# Patient Record
Sex: FEMALE | URBAN - NONMETROPOLITAN AREA
[De-identification: names, ages, dates, MRNs, and addresses within clinical notes are randomized per-mention and may not be internally consistent; named-entity substitution may affect disease eponyms.]

---

## 2018-07-31 ENCOUNTER — OFFICE VISIT (OUTPATIENT)
Dept: VASCULAR SURGERY | Age: 73
End: 2018-07-31
Payer: MEDICARE

## 2018-07-31 ENCOUNTER — OFFICE VISIT (OUTPATIENT)
Dept: CARDIOLOGY | Age: 73
End: 2018-07-31
Payer: MEDICARE

## 2018-07-31 ENCOUNTER — TELEPHONE (OUTPATIENT)
Dept: CARDIOLOGY | Age: 73
End: 2018-07-31

## 2018-07-31 VITALS — SYSTOLIC BLOOD PRESSURE: 120 MMHG | TEMPERATURE: 97 F | DIASTOLIC BLOOD PRESSURE: 75 MMHG | HEART RATE: 76 BPM

## 2018-07-31 VITALS
SYSTOLIC BLOOD PRESSURE: 126 MMHG | WEIGHT: 130 LBS | BODY MASS INDEX: 23.04 KG/M2 | HEIGHT: 63 IN | DIASTOLIC BLOOD PRESSURE: 62 MMHG | HEART RATE: 80 BPM

## 2018-07-31 DIAGNOSIS — G62.9 NEUROPATHY: Primary | ICD-10-CM

## 2018-07-31 DIAGNOSIS — I10 HYPERTENSION, UNSPECIFIED TYPE: ICD-10-CM

## 2018-07-31 PROCEDURE — 99203 OFFICE O/P NEW LOW 30 MIN: CPT | Performed by: PHYSICIAN ASSISTANT

## 2018-07-31 PROCEDURE — 93000 ELECTROCARDIOGRAM COMPLETE: CPT | Performed by: CLINICAL NURSE SPECIALIST

## 2018-07-31 PROCEDURE — 1036F TOBACCO NON-USER: CPT | Performed by: PHYSICIAN ASSISTANT

## 2018-07-31 PROCEDURE — G8420 CALC BMI NORM PARAMETERS: HCPCS | Performed by: PHYSICIAN ASSISTANT

## 2018-07-31 PROCEDURE — 1123F ACP DISCUSS/DSCN MKR DOCD: CPT | Performed by: PHYSICIAN ASSISTANT

## 2018-07-31 PROCEDURE — 3017F COLORECTAL CA SCREEN DOC REV: CPT | Performed by: PHYSICIAN ASSISTANT

## 2018-07-31 PROCEDURE — G8400 PT W/DXA NO RESULTS DOC: HCPCS | Performed by: PHYSICIAN ASSISTANT

## 2018-07-31 PROCEDURE — 1101F PT FALLS ASSESS-DOCD LE1/YR: CPT | Performed by: PHYSICIAN ASSISTANT

## 2018-07-31 PROCEDURE — 1090F PRES/ABSN URINE INCON ASSESS: CPT | Performed by: PHYSICIAN ASSISTANT

## 2018-07-31 PROCEDURE — G8427 DOCREV CUR MEDS BY ELIG CLIN: HCPCS | Performed by: PHYSICIAN ASSISTANT

## 2018-07-31 PROCEDURE — 4040F PNEUMOC VAC/ADMIN/RCVD: CPT | Performed by: PHYSICIAN ASSISTANT

## 2018-07-31 NOTE — PROGRESS NOTES
[facility], [address] [facility] [address] [place] [address], [address] [address]  Phone: [phone]  Fax: [phone]    OFFICE VISIT:  2018    Gatito Ca - : 1945    Reason For Visit:  Jany Dwyer is a 75 y.o. female who is here for New Patient (Patient has had no cardiac symptoms)    HPI   Patient was referred by her PCP for a mildly abnormal NONA the patient hasn't done at a health screening. Alvino Headley is PCP. Gatito Ca has the following history as recorded in [facility]:    Patient Active Problem List    Diagnosis Date Noted    Hypertension 2018     History reviewed. No pertinent past medical history. Past Surgical History:   Procedure Laterality Date    EYE SURGERY      KNEE SURGERY      TONSILLECTOMY      TUBAL LIGATION       Family History   Problem Relation Age of Onset    Hypertension Mother      Social History   Substance Use Topics    Smoking status: Never Smoker    Smokeless tobacco: Never Used    Alcohol use No      No current outpatient prescriptions on file. No current facility-administered medications for this visit. Allergies: Patient has no known allergies. Review of Systems  ROS    Objective  Vital Signs - /62   Pulse 80   Ht 5' 3\" (1.6 m)   Wt 130 lb (59 kg)   BMI 23.03 kg/m²   Physical Exam    Assessment:     Diagnosis Orders   1. Hypertension, unspecified type  EKG 12 lead     EKG shows normal sinus rhythm rate 80  Stable cardiovascular status. No evidence of overt heart failure, angina or dysrhythmia. Plan    Orders Placed This Encounter   Procedures    EKG 12 lead     Order Specific Question:   Reason for Exam?     Answer: Other     No charge for visit visit today. Patient should have been referred to vascular surgery for an abnormal NONA. We arranged a same-day appointment today at vascular surgery office.         DEUCE Rooney

## 2018-07-31 NOTE — PROGRESS NOTES
Patient Care Team:  Ulises Scales as PCP - General (Nurse Practitioner Family)  LEEANNA Francis MD as Consulting Physician (Cardiology)      History and Physical    Ms. Willie Shields is a 76 yo female who presents today as a referral for evaluation of abnormal NONA's from a [facility] screening. She reports that she is currently not on any medication. Rose Green has not had new wounds. She  reports no claudication. She reports some numbness in hr feet. Old records have been obtained, reviewed, and summarized. Samanta Smith is a 75 y.o. female with the following history reviewed and recorded in :  Patient Active Problem List    Diagnosis Date Noted    Hypertension 07/31/2018     No current outpatient prescriptions on file. No current facility-administered medications for this visit. Allergies: Patient has no known allergies. History reviewed. No pertinent past medical history. Past Surgical History:   Procedure Laterality Date    EYE SURGERY      KNEE SURGERY      TONSILLECTOMY      TUBAL LIGATION       Family History   Problem Relation Age of Onset    Hypertension Mother      Social History   Substance Use Topics    Smoking status: Never Smoker    Smokeless tobacco: Never Used    Alcohol use No       Review of Systems    Constitutional - no significant activity change, appetite change, or unexpected weight change. No fever or chills. No diaphoresis or significant fatigue. HENT - no significant rhinorrhea or epistaxis. No tinnitus or significant hearing loss. Eyes - no sudden vision change or amaurosis. Respiratory - no significant shortness of breath, wheezing, or stridor. No apnea, cough, or chest tightness associated with shortness of breath. Cardiovascular - no chest pain, syncope, or significant dizziness. No palpitations or significant leg swelling. Patient reports no claudication. Gastrointestinal - no abdominal swelling or pain. No blood in stool.   No severe constipation,

## 2024-08-19 ENCOUNTER — TELEPHONE (OUTPATIENT)
Dept: VASCULAR SURGERY | Facility: CLINIC | Age: 79
End: 2024-08-19
Payer: MEDICARE

## 2024-08-20 ENCOUNTER — TELEPHONE (OUTPATIENT)
Dept: VASCULAR SURGERY | Facility: CLINIC | Age: 79
End: 2024-08-20
Payer: MEDICARE

## 2024-08-21 ENCOUNTER — OFFICE VISIT (OUTPATIENT)
Dept: VASCULAR SURGERY | Facility: CLINIC | Age: 79
End: 2024-08-21
Payer: MEDICARE

## 2024-08-21 VITALS
SYSTOLIC BLOOD PRESSURE: 130 MMHG | HEIGHT: 63 IN | BODY MASS INDEX: 19.49 KG/M2 | DIASTOLIC BLOOD PRESSURE: 76 MMHG | OXYGEN SATURATION: 97 % | HEART RATE: 88 BPM | WEIGHT: 110 LBS

## 2024-08-21 DIAGNOSIS — I83.93 ASYMPTOMATIC VARICOSE VEINS OF BOTH LOWER EXTREMITIES: Primary | ICD-10-CM

## 2024-08-21 PROCEDURE — 99203 OFFICE O/P NEW LOW 30 MIN: CPT | Performed by: NURSE PRACTITIONER

## 2024-08-21 PROCEDURE — 1160F RVW MEDS BY RX/DR IN RCRD: CPT | Performed by: NURSE PRACTITIONER

## 2024-08-21 PROCEDURE — 1159F MED LIST DOCD IN RCRD: CPT | Performed by: NURSE PRACTITIONER

## 2024-08-21 NOTE — LETTER
September 4, 2024       No Recipients    Patient: Jody Koenig   YOB: 1945   Date of Visit: 8/21/2024     Dear MAXIMUS Mejia:       Thank you for referring Jody Koenig to me for evaluation. Below are the relevant portions of my assessment and plan of care.    If you have questions, please do not hesitate to call me. I look forward to following Jody along with you.         Sincerely,        MAXIMUS Pitt        CC:   No Recipients    Jasmin Combs APRN  09/04/24 1237  Sign when Signing Visit  08/21/2024      Kera Petersen APRN  [address] [place] [address]    Jody Koenig  1945    Chief Complaint   Patient presents with   • NEW PATIENT     Referred from Kera Petersen for varicose veins. Patient complains of varicosities, swelling and bruising for past year. Right legs give more issues than left. Patient has never been a smoker.        Dear MAXIMUS Mejia:      HPI  I had the pleasure of seeing your patient Jody Koenig in the office today.  Thank you kindly for this consultation.  As you recall, Jody Koenig is a 79 y.o.  female who you are currently following for routine health maintenance.  She has some varicose veins to her legs with bruising over the past year.  She really denies significant swelling or discomfort to her lower extremities.    Past Medical History:   Diagnosis Date   • Arthritis    • Cancer     skin       Past Surgical History:   Procedure Laterality Date   • BASAL CELL CARCINOMA EXCISION Right    • CATARACT EXTRACTION Bilateral    • D & C AND LAPAROSCOPY     • KNEE SURGERY Right    • TONSILLECTOMY      5yo   • TUBAL ABDOMINAL LIGATION         Family History   Problem Relation Age of Onset   • Cancer Mother    • Heart failure Father    • Diabetes Maternal Grandfather        Social History     Socioeconomic History   • Marital status:    Tobacco Use   • Smoking status: Never       No Known Allergies    No current outpatient  "medications      Review of Systems   Constitutional: Negative.    HENT: Negative.     Eyes: Negative.    Respiratory: Negative.     Cardiovascular: Negative.    Gastrointestinal: Negative.    Endocrine: Negative.    Genitourinary: Negative.    Musculoskeletal: Negative.    Skin: Negative.    Allergic/Immunologic: Negative.    Neurological: Negative.    Hematological: Negative.         Bruising   Psychiatric/Behavioral: Negative.         /76   Pulse 88   Ht 160 cm (63\")   Wt 49.9 kg (110 lb)   SpO2 97%   BMI 19.49 kg/m²   Physical Exam  Vitals and nursing note reviewed.   Constitutional:       General: She is not in acute distress.     Appearance: Normal appearance. She is well-developed. She is not diaphoretic.   HENT:      Head: Normocephalic and atraumatic.   Eyes:      General: No scleral icterus.     Pupils: Pupils are equal, round, and reactive to light.   Neck:      Thyroid: No thyromegaly.      Vascular: No carotid bruit or JVD.   Cardiovascular:      Rate and Rhythm: Normal rate and regular rhythm.      Pulses: Normal pulses.      Heart sounds: Normal heart sounds and S2 normal. No murmur heard.     No friction rub. No gallop.   Pulmonary:      Effort: Pulmonary effort is normal.      Breath sounds: Normal breath sounds.   Abdominal:      General: Bowel sounds are normal.      Palpations: Abdomen is soft.   Musculoskeletal:         General: No swelling. Normal range of motion.      Cervical back: Normal range of motion and neck supple.   Skin:     General: Skin is warm and dry.      Findings: Bruising present.      Comments: Varicosities to lower extremities.   Neurological:      Mental Status: She is alert and oriented to person, place, and time.      Cranial Nerves: No cranial nerve deficit.   Psychiatric:         Mood and Affect: Mood normal.         Behavior: Behavior normal.         Thought Content: Thought content normal.         Judgment: Judgment normal.         No results found.    There " is no problem list on file for this patient.        ICD-10-CM ICD-9-CM   1. Asymptomatic varicose veins of both lower extremities  I83.93 454.9           Plan: After thoroughly evaluating Jody Koenig, I believe the best course of action is to remain conservative from vascular surgery standpoint.  She does have some varicosities to her lower extremities and bruising however she has no pain no swelling no heaviness no aching.  For now we discussed compression stockings and how to wear them on a daily basis.  She does not wish to pursue with testing as she does not have any symptoms.  Should that change she can call me and I can arrange it otherwise we will see her back as needed going forward.  The patient can continue taking their current medication regimen as previously planned.  This was all discussed in full with complete understanding.    Thank you for allowing me to participate in the care of your patient.  Please do not hesitate with any questions or concerns.  I will keep you aware of any further encounters with Jody Koenig.        Sincerely yours,         MAXIMUS Pitt

## 2024-09-04 NOTE — PROGRESS NOTES
"08/21/2024      Kera Petersen, MAXIMUS  [address] [place] [address]    Jody Koenig  1945    Chief Complaint   Patient presents with    NEW PATIENT     Referred from Kera Petersen for varicose veins. Patient complains of varicosities, swelling and bruising for past year. Right legs give more issues than left. Patient has never been a smoker.        Dear MAXIMUS Mejia:      HPI  I had the pleasure of seeing your patient Jody Koenig in the office today.  Thank you kindly for this consultation.  As you recall, Jody Koenig is a 79 y.o.  female who you are currently following for routine health maintenance.  She has some varicose veins to her legs with bruising over the past year.  She really denies significant swelling or discomfort to her lower extremities.    Past Medical History:   Diagnosis Date    Arthritis     Cancer     skin       Past Surgical History:   Procedure Laterality Date    BASAL CELL CARCINOMA EXCISION Right     CATARACT EXTRACTION Bilateral     D & C AND LAPAROSCOPY      KNEE SURGERY Right     TONSILLECTOMY      3yo    TUBAL ABDOMINAL LIGATION         Family History   Problem Relation Age of Onset    Cancer Mother     Heart failure Father     Diabetes Maternal Grandfather        Social History     Socioeconomic History    Marital status:    Tobacco Use    Smoking status: Never       No Known Allergies    No current outpatient medications      Review of Systems   Constitutional: Negative.    HENT: Negative.     Eyes: Negative.    Respiratory: Negative.     Cardiovascular: Negative.    Gastrointestinal: Negative.    Endocrine: Negative.    Genitourinary: Negative.    Musculoskeletal: Negative.    Skin: Negative.    Allergic/Immunologic: Negative.    Neurological: Negative.    Hematological: Negative.         Bruising   Psychiatric/Behavioral: Negative.         /76   Pulse 88   Ht 160 cm (63\")   Wt 49.9 kg (110 lb)   SpO2 97%   BMI 19.49 kg/m²   Physical " Exam  Vitals and nursing note reviewed.   Constitutional:       General: She is not in acute distress.     Appearance: Normal appearance. She is well-developed. She is not diaphoretic.   HENT:      Head: Normocephalic and atraumatic.   Eyes:      General: No scleral icterus.     Pupils: Pupils are equal, round, and reactive to light.   Neck:      Thyroid: No thyromegaly.      Vascular: No carotid bruit or JVD.   Cardiovascular:      Rate and Rhythm: Normal rate and regular rhythm.      Pulses: Normal pulses.      Heart sounds: Normal heart sounds and S2 normal. No murmur heard.     No friction rub. No gallop.   Pulmonary:      Effort: Pulmonary effort is normal.      Breath sounds: Normal breath sounds.   Abdominal:      General: Bowel sounds are normal.      Palpations: Abdomen is soft.   Musculoskeletal:         General: No swelling. Normal range of motion.      Cervical back: Normal range of motion and neck supple.   Skin:     General: Skin is warm and dry.      Findings: Bruising present.      Comments: Varicosities to lower extremities.   Neurological:      Mental Status: She is alert and oriented to person, place, and time.      Cranial Nerves: No cranial nerve deficit.   Psychiatric:         Mood and Affect: Mood normal.         Behavior: Behavior normal.         Thought Content: Thought content normal.         Judgment: Judgment normal.         No results found.    There is no problem list on file for this patient.        ICD-10-CM ICD-9-CM   1. Asymptomatic varicose veins of both lower extremities  I83.93 454.9           Plan: After thoroughly evaluating Jody Koenig, I believe the best course of action is to remain conservative from vascular surgery standpoint.  She does have some varicosities to her lower extremities and bruising however she has no pain no swelling no heaviness no aching.  For now we discussed compression stockings and how to wear them on a daily basis.  She does not wish to pursue  with testing as she does not have any symptoms.  Should that change she can call me and I can arrange it otherwise we will see her back as needed going forward.  The patient can continue taking their current medication regimen as previously planned.  This was all discussed in full with complete understanding.    Thank you for allowing me to participate in the care of your patient.  Please do not hesitate with any questions or concerns.  I will keep you aware of any further encounters with Jody Koenig.        Sincerely yours,         MAXIMUS Pitt